# Patient Record
Sex: MALE | Race: AMERICAN INDIAN OR ALASKA NATIVE
[De-identification: names, ages, dates, MRNs, and addresses within clinical notes are randomized per-mention and may not be internally consistent; named-entity substitution may affect disease eponyms.]

---

## 2019-06-05 ENCOUNTER — HOSPITAL ENCOUNTER (INPATIENT)
Dept: HOSPITAL 5 - ED | Age: 39
LOS: 1 days | Discharge: HOME | DRG: 309 | End: 2019-06-06
Attending: INTERNAL MEDICINE | Admitting: INTERNAL MEDICINE
Payer: COMMERCIAL

## 2019-06-05 DIAGNOSIS — Z82.49: ICD-10-CM

## 2019-06-05 DIAGNOSIS — G89.29: ICD-10-CM

## 2019-06-05 DIAGNOSIS — E87.1: ICD-10-CM

## 2019-06-05 DIAGNOSIS — I10: ICD-10-CM

## 2019-06-05 DIAGNOSIS — Z79.84: ICD-10-CM

## 2019-06-05 DIAGNOSIS — E11.9: ICD-10-CM

## 2019-06-05 DIAGNOSIS — F17.200: ICD-10-CM

## 2019-06-05 DIAGNOSIS — I48.91: Primary | ICD-10-CM

## 2019-06-05 LAB
ALBUMIN SERPL-MCNC: 3.7 G/DL (ref 3.9–5)
ALT SERPL-CCNC: 29 UNITS/L (ref 7–56)
APTT BLD: 25.2 SEC. (ref 24.2–36.6)
BASOPHILS # (AUTO): 0.2 K/MM3 (ref 0–0.1)
BASOPHILS NFR BLD AUTO: 2.5 % (ref 0–1.8)
BILIRUB DIRECT SERPL-MCNC: < 0.2 MG/DL (ref 0–0.2)
BUN SERPL-MCNC: 15 MG/DL (ref 9–20)
BUN/CREAT SERPL: 19 %
CALCIUM SERPL-MCNC: 9 MG/DL (ref 8.4–10.2)
EOSINOPHIL # BLD AUTO: 0.1 K/MM3 (ref 0–0.4)
EOSINOPHIL NFR BLD AUTO: 1.6 % (ref 0–4.3)
HCT VFR BLD CALC: 55.2 % (ref 35.5–45.6)
HEMOLYSIS INDEX: 13
HGB BLD-MCNC: 19.2 GM/DL (ref 11.8–15.2)
INR PPP: 0.97 (ref 0.87–1.13)
LYMPHOCYTES # BLD AUTO: 2.4 K/MM3 (ref 1.2–5.4)
LYMPHOCYTES NFR BLD AUTO: 34.9 % (ref 13.4–35)
MCHC RBC AUTO-ENTMCNC: 35 % (ref 32–34)
MCV RBC AUTO: 94 FL (ref 84–94)
MONOCYTES # (AUTO): 0.5 K/MM3 (ref 0–0.8)
MONOCYTES % (AUTO): 6.8 % (ref 0–7.3)
PLATELET # BLD: 248 K/MM3 (ref 140–440)
RBC # BLD AUTO: 5.88 M/MM3 (ref 3.65–5.03)

## 2019-06-05 PROCEDURE — 84443 ASSAY THYROID STIM HORMONE: CPT

## 2019-06-05 PROCEDURE — 93005 ELECTROCARDIOGRAM TRACING: CPT

## 2019-06-05 PROCEDURE — 85025 COMPLETE CBC W/AUTO DIFF WBC: CPT

## 2019-06-05 PROCEDURE — 85610 PROTHROMBIN TIME: CPT

## 2019-06-05 PROCEDURE — 85730 THROMBOPLASTIN TIME PARTIAL: CPT

## 2019-06-05 PROCEDURE — 84439 ASSAY OF FREE THYROXINE: CPT

## 2019-06-05 PROCEDURE — 93010 ELECTROCARDIOGRAM REPORT: CPT

## 2019-06-05 PROCEDURE — 82962 GLUCOSE BLOOD TEST: CPT

## 2019-06-05 PROCEDURE — 36415 COLL VENOUS BLD VENIPUNCTURE: CPT

## 2019-06-05 PROCEDURE — 93306 TTE W/DOPPLER COMPLETE: CPT

## 2019-06-05 PROCEDURE — 80048 BASIC METABOLIC PNL TOTAL CA: CPT

## 2019-06-05 PROCEDURE — 83036 HEMOGLOBIN GLYCOSYLATED A1C: CPT

## 2019-06-05 PROCEDURE — 84484 ASSAY OF TROPONIN QUANT: CPT

## 2019-06-05 PROCEDURE — 83735 ASSAY OF MAGNESIUM: CPT

## 2019-06-05 PROCEDURE — 80076 HEPATIC FUNCTION PANEL: CPT

## 2019-06-05 PROCEDURE — 71045 X-RAY EXAM CHEST 1 VIEW: CPT

## 2019-06-05 RX ADMIN — ENOXAPARIN SODIUM SCH MG: 100 INJECTION SUBCUTANEOUS at 11:55

## 2019-06-05 RX ADMIN — METOPROLOL TARTRATE SCH MG: 25 TABLET, FILM COATED ORAL at 13:55

## 2019-06-05 RX ADMIN — METOPROLOL TARTRATE SCH MG: 25 TABLET, FILM COATED ORAL at 21:30

## 2019-06-05 RX ADMIN — AMIODARONE HYDROCHLORIDE SCH MG: 200 TABLET ORAL at 11:55

## 2019-06-05 RX ADMIN — AMIODARONE HYDROCHLORIDE SCH MG: 200 TABLET ORAL at 13:55

## 2019-06-05 RX ADMIN — ENOXAPARIN SODIUM SCH MG: 100 INJECTION SUBCUTANEOUS at 22:13

## 2019-06-05 RX ADMIN — Medication SCH ML: at 22:19

## 2019-06-05 RX ADMIN — AMIODARONE HYDROCHLORIDE SCH MG: 200 TABLET ORAL at 21:35
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
## 2019-06-05 NOTE — CONSULTATION
History of Present Illness


Consult date: 06/05/19


Requesting physician: FATMATA SEGOVIA


Consult reason: atrial fibrillation


History of present illness: 


The pt is a 38 YO male with a past medical history of HTN, DM, tobacco use, ETOH

use. He is previously unknown to our practice. He presented with c/o diaphoresis

and palpitations since this morning. He states that he was in his normal state 

of health when he went to sleep last night. His symptoms were noted after waking

up this morning and progressively got worse while he was getting his child 

dressed for school. He took his child to school and stopped by the [facility] station 

to be evaluated and was noted to be tachycardic and was referred to ED for 

further eval/management. He denies any chest pain, SOB, n/v, dizziness or 

syncope. Following arrival to ED, he was noted to be in AFib with RVR. He was 

given IV cardizem boluses which improved his HR. On evaluation, he remains in 

AFib with HR 70s - 110s, BPs stable. He denies any prior CAD, AMI, HF or 

arrhythmias. He denies any illicit drug use or excessive caffeine intake. He 

drinks 1-2 beers per night and drinks several mixed drinks every weekend. He 

denies any prior cardiac evaluation. His home medication regimen includes 

lisinopril and metformin. 








Past History


Past Medical History: diabetes, hypertension


Social history: , lives with family, smoking, alcohol abuse





Medications and Allergies


                                    Allergies











Allergy/AdvReac Type Severity Reaction Status Date / Time


 


No Known Allergies Allergy   Verified 06/05/19 07:56











                                Home Medications











 Medication  Instructions  Recorded  Confirmed  Last Taken  Type


 


Cyclobenzaprine HCl [Flexeril] 10 mg PO TID PRN #15 tablet 01/14/14  Unknown Rx


 


Ibuprofen [Motrin] 800 mg PO TID PRN #21 tablet 01/14/14  Unknown Rx


 


amLODIPine [Norvasc] 5 mg PO DAILY #30 tab 08/04/14  Unknown Rx











Active Meds: 


Active Medications





Diltiazem HCl (Cardizem/D5w 100mg/100ml)  100 mg in 100 mls @ 5 mls/hr IV TITR 

ALLISON; Protocol











Review of Systems


Constitutional: no weight loss, no weight gain, no fever, no chills, no sweats


Ears, nose, mouth and throat: no ear pain, no nose pain, no sinus pressure, no 

sinus pain


Cardiovascular: palpitations, high blood pressure, no chest pain, no orthopnea, 

no rapid/irregular heart beat, no edema, no syncope, no lightheadedness, no shor

tness of breath, no dyspnea on exertion, no leg edema


Respiratory: no cough, no shortness of breath, no dyspnea on exertion, no 

congestion, no wheezing, no pain on inspiration


Gastrointestinal: no abdominal pain, no nausea, no vomiting, no diarrhea, no 

constipation, no change in bowel habits


Genitourinary Male: no dysuria, no hematuria, no flank pain, no discharge, no 

urinary frequency, no urinary hesitancy


Musculoskeletal: no neck stiffness, no neck pain, no shooting arm pain, no arm 

numbness/tingling, no low back pain, no shooting leg pain, no leg 

numbness/tingling


Integumentary: no rash, no pruritis, no redness, no sores, no wounds


Neurological: no head injury, no paralysis, no weakness, no parathesias, no 

numbness, no tingling, no seizures, no syncope


Psychiatric: no anxiety


Endocrine: no cold intolerance, no heat intolerance


Hematologic/Lymphatic: no easy bruising, no easy bleeding


Allergic/Immunologic: no urticaria, no wheezing





Physical Examination


                                   Vital Signs











Temp Pulse Resp BP Pulse Ox


 


 98.2 F   155 H  22   129/90   97 


 


 06/05/19 07:57  06/05/19 07:57  06/05/19 07:57  06/05/19 07:57  06/05/19 07:57











General appearance: no acute distress


HEENT: Positive: PERRL, Normocephaly, Mucus Membranes Moist


Neck: Positive: neck supple, trachea midline


Cardiac: Positive: irregularly irregular, S1/S2


Lungs: Positive: clear to auscultation


Neuro: Positive: Grossly Intact


Abdomen: Positive: Soft.  Negative: Tender


Skin: Negative: Rash, Wound


Musculoskeletal: No Pain


Extremities: Absent: edema





Results





                                 06/05/19 08:12





                                 06/05/19 08:12


                                 Cardiac Enzymes











  06/05/19 Range/Units





  08:12 


 


AST  28  (5-40)  units/L








                                   Coagulation











  06/05/19 Range/Units





  08:30 


 


PT  13.5  (12.2-14.9)  Sec.


 


INR  0.97  (0.87-1.13)  


 


APTT  25.2  (24.2-36.6)  Sec.








                                       CBC











  06/05/19 Range/Units





  08:12 


 


WBC  7.0  (4.5-11.0)  K/mm3


 


RBC  5.88 H  (3.65-5.03)  M/mm3


 


Hgb  19.2 H  (11.8-15.2)  gm/dl


 


Hct  55.2 H  (35.5-45.6)  %


 


Plt Count  248  (140-440)  K/mm3


 


Lymph #  2.4  (1.2-5.4)  K/mm3


 


Mono #  0.5  (0.0-0.8)  K/mm3


 


Eos #  0.1  (0.0-0.4)  K/mm3


 


Baso #  0.2 H  (0.0-0.1)  K/mm3








                          Comprehensive Metabolic Panel











  06/05/19 06/05/19 Range/Units





  08:12 08:12 


 


Sodium  135 L   (137-145)  mmol/L


 


Potassium  4.5   (3.6-5.0)  mmol/L


 


Chloride  100.6   ()  mmol/L


 


Carbon Dioxide  20 L   (22-30)  mmol/L


 


BUN  15   (9-20)  mg/dL


 


Creatinine  0.8   (0.8-1.5)  mg/dL


 


Glucose  188 H   ()  mg/dL


 


Calcium  9.0   (8.4-10.2)  mg/dL


 


Direct Bilirubin   < 0.2  (0-0.2)  mg/dL


 


Indirect Bilirubin   0.2  mg/dL


 


AST   28  (5-40)  units/L


 


ALT   29  (7-56)  units/L


 


Alkaline Phosphatase   66  ()  units/L


 


Total Protein   7.1  (6.3-8.2)  g/dL


 


Albumin   3.7 L  (3.9-5)  g/dL














- Imaging and Cardiology


Echo: pending


EKG: report reviewed, image reviewed





EKG interpretations





- Telemetry


EKG Rhythm: Atrial Fibrillation





- EKG


Supraventricular dysrhythmia: atrial fibrillation





Assessment and Plan


Will attempt to chemically convert to NSR - initiate PO amiodarone and 

lopressor. If pt does not convert to SR overnight, will tentatively SELAM guided 

DCCV in AM. NPO after MN. 


Initiate full dosage lovenox and consider conversion to NOAC prior to hospital 

discharge.


Obtain thyroid profile and tte. 


Cessation of tobacco and ETOH use strongly encouraged. Assessment and plan 

reviewed with pt and pt's wife at bedside. 





The patient has been seen in conjunction with Dr. Perdue who agrees with the 

assessment and plan of care. 








- Patient Problems


(1) Atrial fibrillation with RVR


Current Visit: Yes   Status: Acute   





(2) HTN (hypertension)


Current Visit: Yes   Status: Chronic   





(3) Diabetes


Current Visit: Yes   Status: Chronic   





(4) Obesity


Current Visit: Yes   Status: Chronic   





(5) Alcohol use


Current Visit: Yes   Status: Chronic   





(6) Tobacco use


Current Visit: Yes   Status: Chronic

## 2019-06-06 VITALS — DIASTOLIC BLOOD PRESSURE: 99 MMHG | SYSTOLIC BLOOD PRESSURE: 156 MMHG

## 2019-06-06 LAB
BASOPHILS # (AUTO): 0 K/MM3 (ref 0–0.1)
BASOPHILS NFR BLD AUTO: 0.6 % (ref 0–1.8)
BUN SERPL-MCNC: 17 MG/DL (ref 9–20)
BUN/CREAT SERPL: 19 %
CALCIUM SERPL-MCNC: 9 MG/DL (ref 8.4–10.2)
EOSINOPHIL # BLD AUTO: 0.1 K/MM3 (ref 0–0.4)
EOSINOPHIL NFR BLD AUTO: 1.5 % (ref 0–4.3)
HCT VFR BLD CALC: 54.7 % (ref 35.5–45.6)
HEMOLYSIS INDEX: 20
HGB BLD-MCNC: 18.6 GM/DL (ref 11.8–15.2)
LYMPHOCYTES # BLD AUTO: 3.6 K/MM3 (ref 1.2–5.4)
LYMPHOCYTES NFR BLD AUTO: 42.8 % (ref 13.4–35)
MCHC RBC AUTO-ENTMCNC: 34 % (ref 32–34)
MCV RBC AUTO: 95 FL (ref 84–94)
MONOCYTES # (AUTO): 0.8 K/MM3 (ref 0–0.8)
MONOCYTES % (AUTO): 10 % (ref 0–7.3)
PLATELET # BLD: 243 K/MM3 (ref 140–440)
RBC # BLD AUTO: 5.73 M/MM3 (ref 3.65–5.03)

## 2019-06-06 RX ADMIN — Medication SCH ML: at 11:33

## 2019-06-06 RX ADMIN — ENOXAPARIN SODIUM SCH MG: 100 INJECTION SUBCUTANEOUS at 11:32

## 2019-06-06 RX ADMIN — METOPROLOL TARTRATE SCH: 25 TABLET, FILM COATED ORAL at 09:10

## 2019-06-06 NOTE — PROGRESS NOTE
Assessment and Plan


Pt converted to NSR overnight and remains in SR today. SELAM guided DCCV 

cancelled. D/c amiodarone and increase lopressor to 25mg BID. Await tte. 


Cont lovenox and plan for conversion to Eliquis prior to hospital discharge. 

Possible d/c home this afternoon. 





The patient has been seen in conjunction with Dr. Perdue who agrees with the 

assessment and plan of care. 








- Patient Problems


(1) Atrial fibrillation with RVR


Current Visit: Yes   Status: Acute   





(2) HTN (hypertension)


Current Visit: Yes   Status: Chronic   





(3) Diabetes


Current Visit: Yes   Status: Chronic   





(4) Obesity


Current Visit: Yes   Status: Chronic   





(5) Alcohol use


Current Visit: Yes   Status: Chronic   





(6) Tobacco use


Current Visit: Yes   Status: Chronic   





Subjective


Date of service: 06/06/19


Principal diagnosis: AFib RVR


Interval history: 


Pt resting in bed, no current cardiac complaints. tele reviewed - pt converted 

to SR overnight and remains in NSR. 








Objective





                                Last Vital Signs











Temp  97.6 F   06/06/19 07:34


 


Pulse  97 H  06/06/19 08:54


 


Resp  18   06/06/19 07:34


 


BP  156/99   06/06/19 07:34


 


Pulse Ox  99   06/06/19 09:01

















- Physical Examination


General: No Apparent Distress


HEENT: Positive: PERRL, Normocephaly, Mucus Membranes Moist


Neck: Positive: neck supple, trachea midline


Cardiac: Positive: Reg Rate and Rhythm, S1/S2


Lungs: Positive: Decreased Breath Sounds


Neuro: Positive: Grossly Intact


Abdomen: Positive: Soft.  Negative: Tender


Skin: Negative: Rash, Wound


Musculoskeletal: No Pain


Extremities: Absent: edema





- Labs and Meds


                                       CBC











  06/06/19 Range/Units





  04:38 


 


WBC  8.4  (4.5-11.0)  K/mm3


 


RBC  5.73 H  (3.65-5.03)  M/mm3


 


Hgb  18.6 H  (11.8-15.2)  gm/dl


 


Hct  54.7 H  (35.5-45.6)  %


 


Plt Count  243  (140-440)  K/mm3


 


Lymph #  3.6  (1.2-5.4)  K/mm3


 


Mono #  0.8  (0.0-0.8)  K/mm3


 


Eos #  0.1  (0.0-0.4)  K/mm3


 


Baso #  0.0  (0.0-0.1)  K/mm3








                          Comprehensive Metabolic Panel











  06/06/19 Range/Units





  04:38 


 


Sodium  137  (137-145)  mmol/L


 


Potassium  4.0  (3.6-5.0)  mmol/L


 


Chloride  101.0  ()  mmol/L


 


Carbon Dioxide  24  (22-30)  mmol/L


 


BUN  17  (9-20)  mg/dL


 


Creatinine  0.9  (0.8-1.5)  mg/dL


 


Glucose  154 H  ()  mg/dL


 


Calcium  9.0  (8.4-10.2)  mg/dL














- Imaging and Cardiology


EKG: report reviewed, image reviewed


Echo: pending

## 2019-06-06 NOTE — EVENT NOTE
Date: 06/06/19


TTE reviewed. Pt remains in NSR. Currently stable cardiac status. Pt may 

discharge home from cardiology standpoint on Lopressor and Eliquis.  


Follow up in our [place] office with Dr. Hogan on 6/14/2019 @ 10:30AM. 





AUGUSTO MONTES DE OCA NP / DR. HOGAN

## 2019-06-06 NOTE — DISCHARGE SUMMARY
Providers





- Providers


Date of Admission: 


06/05/19 10:39





Date of discharge: 06/06/19


Attending physician: 


DK BASILIO





Primary care physician: 


[facility], MD








Hospitalization


Condition: Fair


Hospital course: 


Patient is 40 yo with diabetes, hypertension. He presented with sudden 

palpitation. He denied chest pain. Palpitations worse on exertion and associated

with shortness of breath. He was seen and examined in Emergency Department. EKG 

revealed atrial fibrillation with rapid ventricular response. He was given 

Cardizem iv. Cardiology consulted and patient was evaluated in ED and admitted. 

He was put on Amiodarone and Metoprolol.  Patient evaluated following day and 

was much improved.  He had converted to normal sinus rhythm. Cardiology 

recommended increasing dose of metoprolol and discontinuing Amiodarone.  He was 

subsequently discharged home.





Disposition: DC-01 TO HOME OR SELFCARE





- Discharge Diagnoses


(1) Atrial fibrillation with RVR


Status: Acute   





(2) Diabetes


Status: Chronic   





(3) HTN (hypertension)


Status: Chronic   





(4) Obesity


Status: Chronic   





Core Measure Documentation





- Palliative Care


Palliative Care/ Comfort Measures: Not Applicable





- Core Measures


Any of the following diagnoses?: none





Exam





- Constitutional


Vitals: 


                                        











Temp Pulse Resp BP Pulse Ox


 


 97.6 F   97 H  18   156/99   99 


 


 06/06/19 07:34  06/06/19 08:54  06/06/19 07:34  06/06/19 07:34  06/06/19 09:01














Plan


Activity: no restrictions


Diet: low fat, low cholesterol, low salt, diabetic


Additional Instructions: 1.Follow up with PCP in 1 week.  2.Follow up with Dr. Hogan on 6/14/19


Follow up with: 


BRENDA HOGAN MD [Staff Physician] - 7 Days ([place] office with Dr. Hogan on 6/14/2019 @ 10:30AM. )


[facility] [place] [Primary Care Provider] - 3-5 Days


Prescriptions: 


Apixaban [Eliquis] 5 mg PO Q12HR #60 tablet


Metoprolol [Lopressor TAB] 25 mg PO BID #30 tablet